# Patient Record
Sex: MALE | Race: WHITE
[De-identification: names, ages, dates, MRNs, and addresses within clinical notes are randomized per-mention and may not be internally consistent; named-entity substitution may affect disease eponyms.]

---
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
## 2018-05-04 ENCOUNTER — HOSPITAL ENCOUNTER (OUTPATIENT)
Dept: HOSPITAL 45 - C.ACU | Age: 23
Discharge: HOME | End: 2018-05-04
Payer: COMMERCIAL

## 2018-05-04 VITALS
TEMPERATURE: 97.34 F | DIASTOLIC BLOOD PRESSURE: 66 MMHG | SYSTOLIC BLOOD PRESSURE: 139 MMHG | HEART RATE: 77 BPM | OXYGEN SATURATION: 97 %

## 2018-05-04 VITALS
HEIGHT: 69.02 IN | WEIGHT: 155.43 LBS | BODY MASS INDEX: 23.02 KG/M2 | HEIGHT: 69.02 IN | BODY MASS INDEX: 23.02 KG/M2 | WEIGHT: 155.43 LBS

## 2018-05-04 VITALS
OXYGEN SATURATION: 98 % | DIASTOLIC BLOOD PRESSURE: 60 MMHG | SYSTOLIC BLOOD PRESSURE: 146 MMHG | HEART RATE: 69 BPM | TEMPERATURE: 98.06 F

## 2018-05-04 VITALS
SYSTOLIC BLOOD PRESSURE: 123 MMHG | OXYGEN SATURATION: 99 % | TEMPERATURE: 97.7 F | HEART RATE: 69 BPM | DIASTOLIC BLOOD PRESSURE: 76 MMHG

## 2018-05-04 DIAGNOSIS — S42.032A: Primary | ICD-10-CM
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
## 2018-05-04 NOTE — DIAGNOSTIC IMAGING REPORT
L CLAVICLE



CLINICAL HISTORY: ORIF LT CLAVICLE    



COMPARISON: None



Fluoroscopy time: 15 seconds.



FINDINGS:  2 fluoroscopic images demonstrate plate and screw fixation of a left

clavicular fracture. Fracture is difficult to visualize on this exam. Alignment

appears near anatomic. There are no unexpected radiopaque foreign bodies.



IMPRESSION: Fluoroscopic images demonstrating left clavicular internal fixation

with plate and screws.







Electronically signed by:  Mich Gao M.D.

5/4/2018 3:23 PM



Dictated Date/Time:  5/4/2018 3:22 PM

## 2018-05-04 NOTE — ANESTHESIOLOGY PROGRESS NOTE
Anesthesia Post Op Note


Date & Time


May 4, 2018 at 16:23





Vital Signs


Pain Intensity:  0





Vital Signs Past 12 Hours








  Date Time  Temp Pulse Resp B/P (MAP) Pulse Ox O2 Delivery O2 Flow Rate FiO2


 


5/4/18 15:30  68 16 131/73 97 Room Air  


 


5/4/18 15:20 36.3 67 16 128/83 97 Room Air  


 


5/4/18 15:10  76 16 127/71 98 Room Air  


 


5/4/18 15:00  74 16 138/69 98 Nasal Cannula  


 


5/4/18 14:50 36.2 78 18 137/96 98 Oxymask 3 


 


5/4/18 11:17 36.7 69 18 146/60 (88) 98 Room Air  











Notes


Mental Status:  alert / awake / arousable, participated in evaluation


Pt Amnestic to Procedure:  Yes


Nausea / Vomiting:  adequately controlled


Pain:  adequately controlled


Airway Patency, RR, SpO2:  stable & adequate


BP & HR:  stable & adequate


Hydration State:  stable & adequate


Anesthetic Complications:  no major complications apparent


Block working well in pacu

## 2018-05-04 NOTE — DISCHARGE INSTRUCTIONS
Discharge Instructions


Date of Service


May 4, 2018.





Admission


Reason for Admission:  Fracture Of Unspecified Part Of Left Clavicle





Discharge


Discharge Diagnosis / Problem:  left clavicle fracture





Discharge Goals


Goal(s):  Decrease discomfort, Improve function





Activity Recommendations


Activity Limitations:  per Instructions/Follow-up section


Shower/Bathe:  keep incision dry (Do not shower until after your first follow 

up.)


Weightbearing Status:  Left non-weightbearing





.





Instructions / Follow-Up


Instructions / Follow-Up








                                                                               

  [facility] DISCHARGE INSTRUCTIONS:  OPEN REDUCTION INTERNAL FIXATION CLAVICLE 

FRACTURE





SELF CARE INSTRUCTIONS 





A. You are permitted to loosen your sling/immobilizer to move your elbow, wrist

, and hand to prevent stiffness. 


     You should use your well arm (good arm) to assist the operated extremity 

when trying to raise the arm away from the body, hygiene purposes.


     Do NOT actively try to use/engage your shoulder muscles in operative arm 

at this time. You should NOT do overhead activity, lifting, or attempt to reach 

behind 


     your back.





B. At 72 hours post-operatively, you may change your dressing. (Leave white 

steri-strips intact if present). Use gauze bandages and change daily. 


    Do not shower at this time or get the incision area wet. (No baths, 

swimming pools, hot tubs)





C. Do NOT apply soap or any ointment/lotions directly over incision.





D. You may use ice as needed to operative shoulder








SPECIAL CARE INSTRUCTIONS:


   


**VERY IMPORTANT TO READ AND REVIEW**





A. There are a few signs you need to watch for after you are home. Call 

[facility] at [phone] if you experience any of the 

following:





                  a. Increased severe shoulder pain. Some pain is expected 

especially when you exercise





                  b. Increased swelling in your shoulder or arm; pain or 

swelling in either upper extremity. 


                    (Note: swelling and stiffness is normal and expected for 

several weeks post op, depending on type of shoulder surgery you had).





                 c. Any fluid or drainage from the incision; redness of the 

incision.





                 d. Shortness of breath or chest pain.


 


B. Please call [facility] at [phone] if you have any 

questions or concerns about your operation or recovery.





C. Call your physician if:





              a. Temperature is greater than 101 degrees (F).





              b. Pain is not relieved by prescribed pain medications.





              c. Increase drainage or redness from incision.





              d. Unanswered questions or concerns.





D. Pain Medication:





               a. You will be prescribed pain medication upon discharge that 

should last till your first post-operative appointment.





               b. If you experience nausea and/or skin rash, discontinue this 

medication and contact our office for an alternative medication.





               c. Caution- narcotic pain medication can cause constipation. 








FOLLOW UP VISIT:





Please call [facility] at [phone] to schedule a follow 

up appointment 10-14 days from your surgery date.





Current Hospital Diet


Patient's current hospital diet:





Discharge Diet


Recommended Diet:  Regular Diet





Pending Studies


Studies pending at discharge:  no





Medical Emergencies








.


Who to Call and When:





Medical Emergencies:  If at any time you feel your situation is an emergency, 

please call 911 immediately.





.





Non-Emergent Contact


Non-Emergency issues call your:  Surgeon


Call Non-Emergent contact if:  temperature is above 101, your pain is not 

controlled, your pain is worsening, wound has increased drainage, wound has 

increased redness


.








"Provider Documentation" section prepared by Luis Victor.








.

## 2018-05-04 NOTE — [facility] POST OPERATIVE BRIEF NOTE
Immediate Operative Summary


Operative Date


May 4, 2018.





Pre-Operative Diagnosis





Left displaced distal 1/3 Clavicle Fracture





Post-Operative Diagnosis





Left displaced distal 1/3 Clavicle Fracture





Procedure(s) Performed





Left Clavicle Open Reduction Internal Fixation





Surgeon


Dr. Emerson Washington





Assistant Surgeon(s)


Luis Victor PA-C





Estimated Blood Loss


20 ML





Findings


Consistent with Post-Op Diagnosis





Specimens





none per surgeon





Drains


None





Anesthesia Type


General Regional





Complication(s)


none





Disposition


Accompanied Pt To Recover:  no


Disposition:  Recovery Room / PACU